# Patient Record
Sex: FEMALE | NOT HISPANIC OR LATINO | Employment: OTHER
[De-identification: names, ages, dates, MRNs, and addresses within clinical notes are randomized per-mention and may not be internally consistent; named-entity substitution may affect disease eponyms.]

---

## 2017-10-01 ENCOUNTER — HEALTH MAINTENANCE LETTER (OUTPATIENT)
Age: 52
End: 2017-10-01

## 2021-02-23 ENCOUNTER — TRANSCRIBE ORDERS (OUTPATIENT)
Dept: OTHER | Age: 56
End: 2021-02-23

## 2021-02-23 DIAGNOSIS — Z12.11 SCREENING FOR COLON CANCER: Primary | ICD-10-CM

## 2021-06-29 ENCOUNTER — TRANSCRIBE ORDERS (OUTPATIENT)
Dept: OTHER | Age: 56
End: 2021-06-29

## 2021-06-29 DIAGNOSIS — Z00.00 ENCOUNTER FOR PREVENTIVE CARE: Primary | ICD-10-CM

## 2021-11-11 ENCOUNTER — TRANSCRIBE ORDERS (OUTPATIENT)
Dept: OTHER | Age: 56
End: 2021-11-11
Payer: COMMERCIAL

## 2021-11-11 DIAGNOSIS — M54.50 CHRONIC BILATERAL LOW BACK PAIN WITHOUT SCIATICA: Primary | ICD-10-CM

## 2021-11-11 DIAGNOSIS — G89.29 CHRONIC BILATERAL LOW BACK PAIN WITHOUT SCIATICA: Primary | ICD-10-CM

## 2021-11-18 ENCOUNTER — OFFICE VISIT (OUTPATIENT)
Dept: NEUROSURGERY | Facility: CLINIC | Age: 56
End: 2021-11-18
Attending: PHYSICIAN ASSISTANT
Payer: COMMERCIAL

## 2021-11-18 VITALS
OXYGEN SATURATION: 100 % | DIASTOLIC BLOOD PRESSURE: 109 MMHG | BODY MASS INDEX: 40.4 KG/M2 | HEART RATE: 74 BPM | WEIGHT: 257.4 LBS | HEIGHT: 67 IN | SYSTOLIC BLOOD PRESSURE: 155 MMHG

## 2021-11-18 DIAGNOSIS — G89.29 CHRONIC BILATERAL LOW BACK PAIN WITH BILATERAL SCIATICA: Primary | ICD-10-CM

## 2021-11-18 DIAGNOSIS — M54.42 CHRONIC BILATERAL LOW BACK PAIN WITH BILATERAL SCIATICA: Primary | ICD-10-CM

## 2021-11-18 DIAGNOSIS — M54.41 CHRONIC BILATERAL LOW BACK PAIN WITH BILATERAL SCIATICA: Primary | ICD-10-CM

## 2021-11-18 PROCEDURE — 99203 OFFICE O/P NEW LOW 30 MIN: CPT | Performed by: PHYSICIAN ASSISTANT

## 2021-11-18 PROCEDURE — G0463 HOSPITAL OUTPT CLINIC VISIT: HCPCS

## 2021-11-18 ASSESSMENT — MIFFLIN-ST. JEOR: SCORE: 1800.19

## 2021-11-18 ASSESSMENT — PAIN SCALES - GENERAL: PAINLEVEL: SEVERE PAIN (7)

## 2021-11-18 NOTE — LETTER
11/18/2021         RE: Lisha Marie  [address]  [address]  [place] [facility] [place] [address]        Dear Colleague,    Thank you for referring your patient, Lisha Marie, to the [facility]. Please see a copy of my visit note below.    Neurosurgery Consult    HPI    Ms. Marie is a 56-year-old female presents to clinic for evaluation of back pain and bilateral sciatic pain radiating down the posterior aspects of legs bilaterally.  He denies any bowel or bladder symptoms or saddle anesthesia.  The pain is worse with sitting for a long time but also bothersome with standing and walking.  She states she is done lots of exercises and has tried medication management with naproxen and the symptoms have been present now for more than a year and are not improving.    Medical history  Obesity  Bipolar disorder  Chronic hepatitis C    Social history  Works in retail selling clothes      B/P: 155/109, T: Data Unavailable, P: 74, R: Data Unavailable       Exam    Alert and oriented no acute distress  Bilateral lower extremities with 5/5 strength  Reflexes 2+ patella/ankle  Negative straight leg raise on the left, positive on the right  Negative ankle clonus negative Babinski bilaterally  Lumbar spine nontender to palpation  Able to stand on heels and toes  Gait is normal    Imaging    No imaging to review    Assessment    Bilateral low back pain with bilateral sciatic pain    Plan:      I recommend patient to lumbar MRI without contrast.  I will contact her with the results when they are available.    Total time of 30 minutes spent with the patient today in counseling and coordination of care.      Again, thank you for allowing me to participate in the care of your patient.        Sincerely,        Justin Mosqueda PA-C

## 2021-11-18 NOTE — PROGRESS NOTES
Neurosurgery Consult    HPI    Ms. Marie is a 56-year-old female presents to clinic for evaluation of back pain and bilateral sciatic pain radiating down the posterior aspects of legs bilaterally.  He denies any bowel or bladder symptoms or saddle anesthesia.  The pain is worse with sitting for a long time but also bothersome with standing and walking.  She states she is done lots of exercises and has tried medication management with naproxen and the symptoms have been present now for more than a year and are not improving.    Medical history  Obesity  Bipolar disorder  Chronic hepatitis C    Social history  Works in retail selling clothes      B/P: 155/109, T: Data Unavailable, P: 74, R: Data Unavailable       Exam    Alert and oriented no acute distress  Bilateral lower extremities with 5/5 strength  Reflexes 2+ patella/ankle  Negative straight leg raise on the left, positive on the right  Negative ankle clonus negative Babinski bilaterally  Lumbar spine nontender to palpation  Able to stand on heels and toes  Gait is normal    Imaging    No imaging to review    Assessment    Bilateral low back pain with bilateral sciatic pain    Plan:      I recommend patient to lumbar MRI without contrast.  I will contact her with the results when they are available.    Total time of 30 minutes spent with the patient today in counseling and coordination of care.

## 2021-11-18 NOTE — NURSING NOTE
"Lisha Marie is a 56 year old female who presents for:  Chief Complaint   Patient presents with     Neurologic Problem     Pain in both legs, and lower back.         Initial Vitals:  BP (!) 155/109   Pulse 74   Ht 5' 7\" (1.702 m)   Wt 257 lb 6.4 oz (116.8 kg)   SpO2 100%   BMI 40.31 kg/m   Estimated body mass index is 40.31 kg/m  as calculated from the following:    Height as of this encounter: 5' 7\" (1.702 m).    Weight as of this encounter: 257 lb 6.4 oz (116.8 kg).. Body surface area is 2.35 meters squared. BP completed using cuff size: large  Severe Pain (7)    Nursing Comments:     David Richardson    "

## 2022-03-25 ENCOUNTER — LAB REQUISITION (OUTPATIENT)
Dept: LAB | Facility: CLINIC | Age: 57
End: 2022-03-25
Payer: COMMERCIAL

## 2022-03-25 DIAGNOSIS — F25.1 SCHIZOAFFECTIVE DISORDER, DEPRESSIVE TYPE (H): ICD-10-CM

## 2022-03-25 LAB
ALBUMIN SERPL-MCNC: 3.6 G/DL (ref 3.4–5)
ALP SERPL-CCNC: 56 U/L (ref 40–150)
ALT SERPL W P-5'-P-CCNC: 17 U/L (ref 0–50)
ANION GAP SERPL CALCULATED.3IONS-SCNC: 7 MMOL/L (ref 3–14)
AST SERPL W P-5'-P-CCNC: 14 U/L (ref 0–45)
BILIRUB SERPL-MCNC: 0.5 MG/DL (ref 0.2–1.3)
BUN SERPL-MCNC: 18 MG/DL (ref 7–30)
CALCIUM SERPL-MCNC: 8.6 MG/DL (ref 8.5–10.1)
CHLORIDE BLD-SCNC: 107 MMOL/L (ref 94–109)
CO2 SERPL-SCNC: 25 MMOL/L (ref 20–32)
CREAT SERPL-MCNC: 0.9 MG/DL (ref 0.52–1.04)
GFR SERPL CREATININE-BSD FRML MDRD: 76 ML/MIN/1.73M2
GLUCOSE BLD-MCNC: 76 MG/DL (ref 70–99)
POTASSIUM BLD-SCNC: 3.6 MMOL/L (ref 3.4–5.3)
PROT SERPL-MCNC: 8.1 G/DL (ref 6.8–8.8)
SODIUM SERPL-SCNC: 139 MMOL/L (ref 133–144)
TSH SERPL DL<=0.005 MIU/L-ACNC: 0.88 MU/L (ref 0.4–4)

## 2022-03-25 PROCEDURE — 84443 ASSAY THYROID STIM HORMONE: CPT | Mod: ORL

## 2022-03-25 PROCEDURE — 80053 COMPREHEN METABOLIC PANEL: CPT | Mod: ORL

## 2022-09-27 ENCOUNTER — TRANSCRIBE ORDERS (OUTPATIENT)
Dept: OTHER | Age: 57
End: 2022-09-27

## 2022-09-27 DIAGNOSIS — M25.562 CHRONIC PAIN OF BOTH KNEES: Primary | ICD-10-CM

## 2022-09-27 DIAGNOSIS — G89.29 CHRONIC PAIN OF BOTH KNEES: Primary | ICD-10-CM

## 2022-09-27 DIAGNOSIS — M25.561 CHRONIC PAIN OF BOTH KNEES: Primary | ICD-10-CM

## 2023-02-14 DIAGNOSIS — G47.33 OSA (OBSTRUCTIVE SLEEP APNEA): Primary | ICD-10-CM

## 2023-02-15 ENCOUNTER — LAB REQUISITION (OUTPATIENT)
Dept: LAB | Facility: CLINIC | Age: 58
End: 2023-02-15
Payer: COMMERCIAL

## 2023-02-15 DIAGNOSIS — R53.82 CHRONIC FATIGUE, UNSPECIFIED: ICD-10-CM

## 2023-02-15 PROCEDURE — 80053 COMPREHEN METABOLIC PANEL: CPT | Mod: ORL

## 2023-02-15 PROCEDURE — 84443 ASSAY THYROID STIM HORMONE: CPT | Mod: ORL

## 2023-02-15 PROCEDURE — 82306 VITAMIN D 25 HYDROXY: CPT | Mod: ORL

## 2023-02-16 LAB
ALBUMIN SERPL BCG-MCNC: 4.2 G/DL (ref 3.5–5.2)
ALP SERPL-CCNC: 50 U/L (ref 35–104)
ALT SERPL W P-5'-P-CCNC: 13 U/L (ref 10–35)
ANION GAP SERPL CALCULATED.3IONS-SCNC: 13 MMOL/L (ref 7–15)
AST SERPL W P-5'-P-CCNC: 18 U/L (ref 10–35)
BILIRUB SERPL-MCNC: 0.3 MG/DL
BUN SERPL-MCNC: 14.2 MG/DL (ref 6–20)
CALCIUM SERPL-MCNC: 9.3 MG/DL (ref 8.6–10)
CHLORIDE SERPL-SCNC: 106 MMOL/L (ref 98–107)
CREAT SERPL-MCNC: 0.81 MG/DL (ref 0.51–0.95)
DEPRECATED CALCIDIOL+CALCIFEROL SERPL-MC: 15 UG/L (ref 20–75)
DEPRECATED HCO3 PLAS-SCNC: 25 MMOL/L (ref 22–29)
GFR SERPL CREATININE-BSD FRML MDRD: 85 ML/MIN/1.73M2
GLUCOSE SERPL-MCNC: 89 MG/DL (ref 70–99)
POTASSIUM SERPL-SCNC: 3.5 MMOL/L (ref 3.4–5.3)
PROT SERPL-MCNC: 7.6 G/DL (ref 6.4–8.3)
SODIUM SERPL-SCNC: 144 MMOL/L (ref 136–145)
TSH SERPL DL<=0.005 MIU/L-ACNC: 0.6 UIU/ML (ref 0.3–4.2)

## 2024-03-18 ENCOUNTER — LAB REQUISITION (OUTPATIENT)
Dept: LAB | Facility: CLINIC | Age: 59
End: 2024-03-18
Payer: COMMERCIAL

## 2024-03-18 DIAGNOSIS — Z51.81 ENCOUNTER FOR THERAPEUTIC DRUG LEVEL MONITORING: ICD-10-CM

## 2024-03-18 DIAGNOSIS — F25.0 SCHIZOAFFECTIVE DISORDER, BIPOLAR TYPE (H): ICD-10-CM

## 2024-03-18 LAB
ALBUMIN SERPL BCG-MCNC: 4.1 G/DL (ref 3.5–5.2)
ALP SERPL-CCNC: 69 U/L (ref 40–150)
ALT SERPL W P-5'-P-CCNC: 16 U/L (ref 0–50)
ANION GAP SERPL CALCULATED.3IONS-SCNC: 12 MMOL/L (ref 7–15)
AST SERPL W P-5'-P-CCNC: 19 U/L (ref 0–45)
BILIRUB SERPL-MCNC: 0.2 MG/DL
BUN SERPL-MCNC: 19 MG/DL (ref 6–20)
CALCIUM SERPL-MCNC: 9 MG/DL (ref 8.6–10)
CHLORIDE SERPL-SCNC: 106 MMOL/L (ref 98–107)
CREAT SERPL-MCNC: 0.85 MG/DL (ref 0.51–0.95)
DEPRECATED HCO3 PLAS-SCNC: 24 MMOL/L (ref 22–29)
EGFRCR SERPLBLD CKD-EPI 2021: 80 ML/MIN/1.73M2
GLUCOSE SERPL-MCNC: 96 MG/DL (ref 70–99)
POTASSIUM SERPL-SCNC: 3.6 MMOL/L (ref 3.4–5.3)
PROT SERPL-MCNC: 7.6 G/DL (ref 6.4–8.3)
SODIUM SERPL-SCNC: 142 MMOL/L (ref 135–145)
TSH SERPL DL<=0.005 MIU/L-ACNC: 0.58 UIU/ML (ref 0.3–4.2)

## 2024-03-18 PROCEDURE — 80053 COMPREHEN METABOLIC PANEL: CPT | Mod: ORL | Performed by: NURSE PRACTITIONER

## 2024-03-18 PROCEDURE — 84443 ASSAY THYROID STIM HORMONE: CPT | Mod: ORL | Performed by: NURSE PRACTITIONER

## 2025-04-01 ENCOUNTER — HOSPITAL ENCOUNTER (OUTPATIENT)
Facility: CLINIC | Age: 60
Setting detail: OBSERVATION
Discharge: HOME | End: 2025-04-02
Attending: EMERGENCY MEDICINE | Admitting: PSYCHIATRY & NEUROLOGY
Payer: COMMERCIAL

## 2025-04-01 DIAGNOSIS — F25.1 SCHIZOAFFECTIVE DISORDER, DEPRESSIVE TYPE (H): ICD-10-CM

## 2025-04-01 PROBLEM — F32.A DEPRESSION, UNSPECIFIED: Status: ACTIVE | Noted: 2025-04-01

## 2025-04-01 PROBLEM — F41.9 ANXIETY DISORDER, UNSPECIFIED: Status: ACTIVE | Noted: 2025-04-01

## 2025-04-01 PROCEDURE — 250N000013 HC RX MED GY IP 250 OP 250 PS 637: Performed by: NURSE PRACTITIONER

## 2025-04-01 PROCEDURE — 99285 EMERGENCY DEPT VISIT HI MDM: CPT

## 2025-04-01 PROCEDURE — G0378 HOSPITAL OBSERVATION PER HR: HCPCS

## 2025-04-01 PROCEDURE — 99222 1ST HOSP IP/OBS MODERATE 55: CPT | Mod: AI | Performed by: NURSE PRACTITIONER

## 2025-04-01 RX ORDER — DIPHENHYDRAMINE HCL 50 MG/1
50 CAPSULE ORAL EVERY 6 HOURS PRN
COMMUNITY
Start: 2025-02-05

## 2025-04-01 RX ORDER — NAPROXEN 500 MG/1
500 TABLET, DELAYED RELEASE ORAL
COMMUNITY
Start: 2024-08-29 | End: 2025-04-01

## 2025-04-01 RX ORDER — ACETAMINOPHEN 500 MG
1000 TABLET ORAL EVERY 6 HOURS PRN
Status: DISCONTINUED | OUTPATIENT
Start: 2025-04-01 | End: 2025-04-02 | Stop reason: HOSPADM

## 2025-04-01 RX ORDER — GABAPENTIN 300 MG/1
300 CAPSULE ORAL PRN
COMMUNITY
Start: 2022-01-10

## 2025-04-01 RX ORDER — ACETAMINOPHEN 500 MG
1000 TABLET ORAL EVERY 6 HOURS PRN
COMMUNITY
Start: 2022-01-10

## 2025-04-01 RX ORDER — AMLODIPINE BESYLATE 5 MG/1
5 TABLET ORAL DAILY
Status: DISCONTINUED | OUTPATIENT
Start: 2025-04-02 | End: 2025-04-02 | Stop reason: HOSPADM

## 2025-04-01 RX ORDER — GABAPENTIN 300 MG/1
300-600 CAPSULE ORAL 3 TIMES DAILY PRN
Status: DISCONTINUED | OUTPATIENT
Start: 2025-04-01 | End: 2025-04-02 | Stop reason: HOSPADM

## 2025-04-01 RX ORDER — NAPROXEN 375 MG/1
500 TABLET ORAL 2 TIMES DAILY WITH MEALS
COMMUNITY
Start: 2021-11-10

## 2025-04-01 RX ORDER — DIPHENHYDRAMINE HCL 25 MG
50 CAPSULE ORAL EVERY 6 HOURS PRN
Status: DISCONTINUED | OUTPATIENT
Start: 2025-04-01 | End: 2025-04-02 | Stop reason: HOSPADM

## 2025-04-01 RX ORDER — OLANZAPINE 10 MG/1
10 TABLET ORAL AT BEDTIME
Status: DISCONTINUED | OUTPATIENT
Start: 2025-04-01 | End: 2025-04-02 | Stop reason: HOSPADM

## 2025-04-01 RX ORDER — NAPROXEN 250 MG/1
500 TABLET ORAL 2 TIMES DAILY WITH MEALS
Status: DISCONTINUED | OUTPATIENT
Start: 2025-04-01 | End: 2025-04-02 | Stop reason: HOSPADM

## 2025-04-01 RX ADMIN — OLANZAPINE 10 MG: 10 TABLET, FILM COATED ORAL at 21:47

## 2025-04-01 RX ADMIN — GABAPENTIN 600 MG: 300 CAPSULE ORAL at 17:25

## 2025-04-01 RX ADMIN — DIPHENHYDRAMINE HYDROCHLORIDE 50 MG: 25 CAPSULE ORAL at 17:25

## 2025-04-01 RX ADMIN — NAPROXEN 500 MG: 250 TABLET ORAL at 17:25

## 2025-04-01 ASSESSMENT — COLUMBIA-SUICIDE SEVERITY RATING SCALE - C-SSRS
2. HAVE YOU ACTUALLY HAD ANY THOUGHTS OF KILLING YOURSELF IN THE PAST MONTH?: NO
6. HAVE YOU EVER DONE ANYTHING, STARTED TO DO ANYTHING, OR PREPARED TO DO ANYTHING TO END YOUR LIFE?: NO
1. IN THE PAST MONTH, HAVE YOU WISHED YOU WERE DEAD OR WISHED YOU COULD GO TO SLEEP AND NOT WAKE UP?: NO

## 2025-04-01 ASSESSMENT — ACTIVITIES OF DAILY LIVING (ADL)
ADLS_ACUITY_SCORE: 41

## 2025-04-01 NOTE — CONSULTS
"Diagnostic Evaluation Consultation  Crisis Assessment    Patient Name: Lisha Marie  Age:  59 year old  Legal Sex: female  Gender Identity: female  Pronouns: she/her  Race: Choose not to Answer  Ethnicity: Not  or   Language: English      Patient was assessed: In person   Crisis Assessment Start Date: 04/01/25  Crisis Assessment Start Time: 1527  Crisis Assessment Stop Time: 1551  Patient location: [facility] Emergency Dept                             [id number]    Referral Data and Chief Complaint  Lisha Marie presents to the ED with family/friends. Patient is presenting to the ED for the following concerns: Depression, Anxiety. Factors that make the mental health crisis life threatening or complex are: Pt arrives to the ED after experiencing heightened emotional distress today in her home. Pt reports that she woke up feeling unable to get out of bed due to feeling especially depressed and anxious. Pt says that she remained in bed and was crying profusely and suffering panic attacks throughout the morning, when she called loved ones to bring her to the hospital. During assessment, pt oriented to person, place, time, and situation, but guarded and easily overwhelmed by questions from writer. Pt says that she is hoping to see a psychiatrist so that she can get a possible medication adjustment and noted that she was supposed to have a psychiatry appointment today at the [facility] clinic but was unable to attend her appointment due to her distress. Pt unable to recall if she has psychiatry established and unable to recall if she has case management services. Pt reports that she was prescribed Lithium in the past, and it was helpful, but that providers had her stop taking it due to complications from her other medications. Pt reports that she takes \"a lot\" of different medications to help her with sleep. Pt became upset when writer asked if she has been experiencing suicidal ideation, adamantly " "stating that she does not have any SI and expressing frustration at having been asked this multiple times today by various individuals. Pt says that suicide is a \"sin\" and that she has no desire to hurt herself. Pt is denying any HI/AH/VH/NSSIB. Pt denies any recent substance use. Pt reports that she is willing to come over to [facility] to discuss medication management with the psychiatric provider and to work on symptom stabilization..      Informed Consent and Assessment Methods  Explained the crisis assessment process, including applicable information disclosures and limits to confidentiality, assessed understanding of the process, and obtained consent to proceed with the assessment.  Assessment methods included conducting a formal interview with patient, review of medical records, collaboration with medical staff, and obtaining relevant collateral information from family and community providers when available.  : done     History of the Crisis   Per chart review, pt has a history of Shizoaffective Disorder Bipolar Type and PTSD. Chart indicates pt has a history of Alcohol Use Disorder. Pt denies that she's had any previous inpatient psychiatric hospitalizations and denies any previous suicide attempts. Pt's daughter, who accompanies pt during current ED visit, says that pt does not normally present as agitated and argumentative as she has today, and that it is indicative of her heightened anxiety.    Brief Psychosocial History  Family:   (Pt lives alone but has a significant other named Ayo), Children    Support System:  Significant Other, Children (daughter Amparo)  Employment Status:  employed part-time (works in retail part-time)  Source of Income:  salary/wages  Financial Environmental Concerns:     Current Hobbies:  interaction with pets  Barriers in Personal Life:       Significant Clinical History  Current Anxiety Symptoms:  panic attack, excessive worry, anxious, racing thoughts  Current " Depression/Trauma:  negativistic, crying or feels like crying, hopelessness  Current Somatic Symptoms:     Current Psychosis/Thought Disturbance:     Current Eating Symptoms:     Chemical Use History:  Alcohol: None  Benzodiazepines: None  Opiates: None  Cocaine: None  Marijuana: None  Other Use: None   Past diagnosis:  Other, Depression, Anxiety Disorder (Schizoaffective Disorder, Bipolar Type)  Family history:  No known history of mental health or chemical health concerns  Past treatment:     Details of most recent treatment:  Pt denies being established with an outpatient psychotherapist and says it is unknown to her if she has a psychiatrist or case management worker. Pt reports that she is having a hard time remembering things right now due to her anxiety and racing thoughts. Pt has established primary care at [facility]. Pt denies any previous psychiatric inpatient hospitalizations.  Other relevant history:  Per chart review, pt had a telemedicine visit with a psychiatric provider at [facility] today, 04/01/2025.    Have there been any medication changes in the past two weeks:  no       Is the patient compliant with medications:  yes        Collateral Information  Is there collateral information: Yes     Collateral information name, relationship, phone number:  daughter Amparo Marie [phone]    What happened today: Amparo says she was at work today and noticed that she had missed phone calls from her aunt, which is not typical. She called her aunt, who told her that she needs to get in contact with her mother/pt, who she said was crying hysterically. Amparo called her mother, who was crying profusely and saying she was in a lot of pain. Amparo says she does not know if she meant physical pain or emotional pain, but that it was her impression that this was her mother's way of communicating that she has in a lot of emotional distress. Pt requested to be brought to a hospital, so pt brought her here for psych  evaluation.     What is different about patient's functioning: Pt has had depression and anxiety for years, but today was a clear diversion from her typical baseline functioning. Amparo says it's been a long time since she's seen her mother this escalated.     What do you think the patient needs:  medication management and symptom stabilization at [facility]    Has patient made comments about wanting to kill themselves/others: no    If d/c is recommended, can they take part in safety/aftercare planning: yes    Additional collateral information:   n/a     Risk Assessment  [place] Suicide Severity Rating Scale Full Clinical Version: 04/01/2025  Suicidal Ideation  Q1 Wish to be Dead (Lifetime): No  Q2 Non-Specific Active Suicidal Thoughts (Lifetime): No  Q6 Suicide Behavior (Lifetime): no  Intensity of Ideation (Lifetime)  Description of Most Severe Ideation (Lifetime): n/a  Suicidal Behavior (Lifetime)  Actual Attempt (Lifetime): No  Has subject engaged in non-suicidal self-injurious behavior? (Lifetime): No  Interrupted Attempts (Lifetime): No  Aborted or Self-Interrupted Attempt (Lifetime): No  Preparatory Acts or Behavior (Lifetime): No    [place] Suicide Severity Rating Scale Recent: 04/01/2025  Suicidal Ideation (Recent)  Q1 Wished to be Dead (Past Month): no  Q2 Suicidal Thoughts (Past Month): no  Level of Risk per Screen: no risks indicated     Suicidal Behavior (Recent)  Actual Attempt (Past 3 Months): No  Total Number of Actual Attempts (Past 3 Months): 0  Actual Attempt Description (Past 3 Months): n/a  Has subject engaged in non-suicidal self-injurious behavior? (Past 3 Months): No  Interrupted Attempts (Past 3 Months): No  Total Number of Interrupted Attempts (Past 3 Months): 0  Interrupted Attempt Description (Past 3 Months): n/a  Aborted or Self-Interrupted Attempt (Past 3 Months): No  Total Number of Aborted or Self-Interrupted Attempts (Past 3 Months): 0  Aborted or Self-Interrupted Attempt Description  (Past 3 Months): n/a  Preparatory Acts or Behavior (Past 3 Months): No  Total Number of Preparatory Acts (Past 3 Months): 0  Preparatory Acts or Behavior Description (Past 3 Months): n/a    Environmental or Psychosocial Events: social isolation  Protective Factors: Protective Factors: strong bond to family unit, community support, or employment, responsibilities and duties to others, including pets and children, lives in a responsibly safe and stable environment, help seeking, sense of importance of health and wellness, reality testing ability    Does the patient have thoughts of harming others? Feels Like Hurting Others: no  Previous Attempt to Hurt Others: no  Is the patient engaging in sexually inappropriate behavior?: no  Does Patient have a known history of aggressive behavior: No  Has aggression occurred as a result of MH concerns/diagnosis: n/a  Does patient have history of aggression in hospital: n/a    Is the patient engaging in sexually inappropriate behavior?  no        Mental Status Exam   Affect: Dramatic  Appearance: Appropriate  Attention Span/Concentration: Attentive  Eye Contact: Avoidant    Fund of Knowledge: Appropriate   Language /Speech Content: Fluent  Language /Speech Volume: Normal  Language /Speech Rate/Productions: Articulate  Recent Memory: Variable  Remote Memory: Intact  Mood: Depressed, Anxious, Irritable  Orientation to Person: Yes   Orientation to Place: Yes  Orientation to Time of Day: Yes  Orientation to Date: Yes     Situation (Do they understand why they are here?): Yes  Psychomotor Behavior: Normal  Thought Content: Clear  Thought Form: Intact        Medication  Psychotropic medications:   Medication Orders - Psychiatric (From admission, onward)      None             Current Care Team  Patient Care Team:  Valarie Chandler MD as PCP - General  CobbDhiraj cardoso MD as Referring Physician (Internal Medicine)    Diagnosis  Patient Active Problem List   Diagnosis Code     Schizoaffective disorder, bipolar type (H) F25.0    Arthritis M19.90    Essential hypertension I10    Hepatic steatosis K76.0    Chronic hepatitis C without hepatic coma (H) B18.2    Morbid obesity due to excess calories (H) E66.01    Depression, unspecified F32.A    Anxiety disorder, unspecified F41.9       Primary Problem This Admission  Active Hospital Problems    Depression, unspecified - F32.A      *Anxiety disorder, unspecified - F41.9      Schizoaffective disorder, bipolar type (H) - F25.0        Clinical Summary and Substantiation of Recommendations   Clinical Substantiation:  It is the recommendation of this clinician that the pt remain in the ER in observation status. The pt presented to the ED after experiencing acute emotional distress and anxiety while at home today, which led to her having  an episode of panic attacks and inability to get out of bed. The pt is denying SI/HI/AH/VH/NSSIB or substance use and has protective factors such as a support system, help seeking behavior, and future focused thinking. However, pt is expressing that her functioning in carrying out tasks at home is impacted by her crisis state. Throughout assessment, pt presents as easily overwhelmed. Given her presentation, observation is the least restrictive level of care that also provides adequate risk mitigation. Additional time on the unit gives time and space from the precipitating stressor while engaging in therapeutic intervention aimed at increasing distress tolerance skills and creating a cope ahead plan for interpersonal stressors. There is a likelihood that this time in observation will lessen the acuity of the crisis, enabling the pt to effectively safety plan and return to the community. Pt has a history of psychiatric medication management but is not currently established with a psychiatric provider. For continuity of care, it is recommended to schedule the pt with the Transition Clinic within 24 to 48 hours of  discharge.    Goals for crisis stabilization:  symptom stabilization, development of coping skills    Next steps for Care Team:  safety planning with pt before discharge and set up of services/referrals for pt at time of discharge    Treatment Objectives Addressed:  orienting the patient to therapy, identifying and practicing coping strategies, assessing safety    Therapeutic Interventions:  Engaged in cognitive restructuring/ reframing, looked at common cognitive distortions and challenged negative thoughts.    Has a specific means been identified for suicidal/homicide actions: No      Patient coping skills attempted to reduce the crisis:  Pt reports that she prays to cope with her emotional distress and anxiety.    Disposition  Recommended referrals: Individual Therapy, Medication Management        Reviewed case and recommendations with attending provider. Attending Name: Elvis Scott CNP       Attending concurs with disposition: yes       Patient and/or validated legal guardian concurs with disposition: yes       Final disposition:  observation        Legal status: Voluntary/Patient has signed consent for treatment                                                                             Reviewed court records: yes       Assessment Details   Total duration spent with the patient: 24 min     CPT code(s) utilized: 47642 - Psychotherapy (with patient) - 30 (16-37*) min    ANA Solares, Psychotherapist  DEC - Triage & Transition Services  Callback: [phone]

## 2025-04-01 NOTE — ED NOTES
"59 year old female received to the [facility] unit in an agitated-dysregulated state. Reports immobilizing anxiety and heightened arthritis pain. Could not \"get out of bed\" today so directed in by clinic and brought in by family. History of schizoaffective disorder-Bipolar type. Stable in past on Lithium but lithium currently contraindicated due to other medication/medical issues per patient. Very labile and demanding in presentation. Eventually agreed to try [facility] so she could be evaluated by psychiatry. Denies any current SI.     Nursing and risk assessments completed.  Assessments reviewed with LMHP and physician. Video monitoring in progress, patient informed.  Admission information reviewed with patient. Patient given a tour of [facility] and instructions on using the facility. Questions regarding [facility] addressed. Pt search completed and belongings inventoried.   "

## 2025-04-01 NOTE — ED NOTES
Pt. became increasingly agitated as writer attempted to educate patient about the [facility] unit. Very reluctant to be without her daughter and sit/sleep in a recliner due to arthritis pain. Reluctant to secure her belongings and reluctant to be in an open milieu. DEC advised to see patient over in ER -will transition to [facility] if she changes her mind.

## 2025-04-01 NOTE — PLAN OF CARE
Lisha Marie  April 1, 2025  Plan of Care Hand-off Note     Patient Recommended Care Path: observation    Clinical Substantiation:  It is the recommendation of this clinician that the pt remain in the ER in observation status. The pt presented to the ED after experiencing acute emotional distress and anxiety while at home today, which led to her having  an episode of panic attacks and inability to get out of bed. The pt is denying SI/HI/AH/VH/NSSIB or substance use and has protective factors such as a support system, help seeking behavior, and future focused thinking. However, pt is expressing that her functioning in carrying out tasks at home is impacted by her crisis state. Throughout assessment, pt presents as easily overwhelmed. Given her presentation, observation is the least restrictive level of care that also provides adequate risk mitigation. Additional time on the unit gives time and space from the precipitating stressor while engaging in therapeutic intervention aimed at increasing distress tolerance skills and creating a cope ahead plan for interpersonal stressors. There is a likelihood that this time in observation will lessen the acuity of the crisis, enabling the pt to effectively safety plan and return to the community. Pt has a history of psychiatric medication management but is not currently established with a psychiatric provider. For continuity of care, it is recommended to schedule the pt with the Transition Clinic within 24 to 48 hours of discharge.    Goals for crisis stabilization:  symptom stabilization, development of coping skills    Next steps for Care Team:  safety planning with pt before discharge and set up of services/referrals for pt at time of discharge    Treatment Objectives Addressed:  orienting the patient to therapy, identifying and practicing coping strategies, assessing safety    Therapeutic Interventions:  Engaged in cognitive restructuring/ reframing, looked at common  cognitive distortions and challenged negative thoughts.    Has a specific means been identified for suicidal.homicide actions: No    Patient coping skills attempted to reduce the crisis:  Pt reports that she prays to cope with her emotional distress and anxiety.      Collateral contact information:  daughter Amparo Marie [phone]    Legal Status: Voluntary/Patient has signed consent for treatment                                                   Reviewed court records: yes     Psychiatry Consult:     ANA Solares

## 2025-04-01 NOTE — ED NOTES
Pt states that she had previously been on lithium and it worked great but because of other medications she was on, she had to be taken off.  Pt states that because of her anxiety and depression, she has not been able to properly care for herself.  States that she has not been able to perform her ADLs.

## 2025-04-01 NOTE — ED NOTES
[facility]  ED to [facility] Checklist:      Goal for [facility]: Depression management, Medication management,  Referral and resources, and Time to stabilize    Current Behavior: Sad    Safety Concerns: None    Legal Hold Status: Voluntary    Medically Cleared by ED provider: Yes    Patient Therapeutically Searched: Therapeutic search by ED staff (strings, belts, shoes, pockets, electronics, etc.)    Belongings: Remain with patient    Independent Ambulation at Baseline: Yes/No: Yes    Participates in Care/Conversation: Yes/No: Yes    Patient Informed about [facility]: Yes/No: Yes    DEC: Ordered and pending    Patient Ready to be Transferred to [facility]? Yes/No: Yes

## 2025-04-01 NOTE — ED PROVIDER NOTES
"  Emergency Department Note      History of Present Illness     Chief Complaint   Psychiatric Evaluation      HPI   Lisha Marie is a 59 year old female with history of schizoaffective disorder presenting with her daughter for a psychiatric evaluation.  She reports severe depression and anxiety such that she \"cannot move\" and cannot care for herself worsening over the last month.  Last night was particularly bad as she believes she had an anxiety attack.  She feels \"restless\" with a \"scrambled memory.\"  She is compliant with her psychiatric medications but does not feel they are helpful.  In the past she has been on lithium and found that to be the only thing that helped her mood.  She denies suicidal ideation and homicidal ideation and tells me, \"I love myself.\"  She has never required psychiatric hospitalization in the past.  While she has a psychiatry provider, she does not have a therapist.  She denies drug or alcohol use.  She has chronic insomnia.    Independent Historian   None    Review of External Notes   I reviewed the patients visit with psychiatry today via telehealth. Patient is unable to do the things she needs to do in order to take care of herself, mentioning on the call that she cannot get out of bed and needs help. They felt that she was decompensated and encouraged her to go to the hospital, they ordered her Prozac but stated it will take a while for it to work effectively.     Past Medical History     Medical History and Problem List   Alcohol abuse  Chronic pain of both knees  PTSD  Schizoaffective disorder  Chronic hepatitis C without hepatic coma  Hypertension  Arthritis  Bipolar disorder without psychotic features  Hepatic steatosis  Morbid obesity due to excess calories    Medications   Abilify  Wellbutrin XL  Cymbalta  Prozac  Neurontin  Hyzaar  Naprosyn  Desyrel  Norvasc  Harvoni    Physical Exam     Patient Vitals for the past 24 hrs:   BP Temp Temp src Pulse Resp SpO2 Height Weight " "  04/01/25 1329 (!) 141/103 98.5  F (36.9  C) Temporal 80 18 100 % 1.702 m (5' 7\") 108.9 kg (240 lb)     Physical Exam  General: Well-developed and well-nourished. Well appearing middle-aged -American woman. Cooperative.  Head:  Atraumatic.  Eyes:  Conjunctivae, lids, and sclerae are normal.  ENT:    Normal nose. Moist mucous membranes.  Neck:  Supple. Normal range of motion.  CV:  Well-perfused   Resp:  No respiratory distress.   GI:  Non-distended.     MS:  Normal ROM.   Skin:  Warm. Non-diaphoretic. No pallor.  Neuro:  Awake. A&Ox3. Normal strength.  Psych: Anxious and dysphoric mood and affect. Normal speech.  No suicidal thought content or plan.  Not responding to internal stimuli.  Vitals reviewed.    Diagnostics     Lab Results   Labs Ordered and Resulted from Time of ED Arrival to Time of ED Departure - No data to display    Imaging   No orders to display       Independent Interpretation   None    ED Course      Medications Administered   Medications - No data to display    Procedures   Procedures     Discussion of Management   None    ED Course   ED Course as of 04/01/25 1656   Tue Apr 01, 2025   1421 I obtained history and examined the patient as noted above     1553 I spoke with PRERNA Antonio, after her evaluation of the patient.        Additional Documentation  {EPPAAdditionalPhrasnimo:[id number]::\"None\"}    Medical Decision Making / Diagnosis     CMS Diagnoses: None    MIPS       None    MIGUEL ÁNGEL Marie is a 59 year old female ***    Disposition   The patient was transferred to [facility].     Diagnosis   No diagnosis found.     Discharge Medications   New Prescriptions    No medications on file         Scribe Disclosure:  I, Moy Amor, am serving as a scribe at 1:37 PM on 4/1/2025 to document services personally performed by Leora Johnston MD based on my observations and the provider's statements to me.     " sent there without issue.    Disposition   The patient was transferred to [facility].     Diagnosis   Schizoaffective disorder      Scribe Disclosure:  I, Moy Amor, am serving as a scribe at 1:37 PM on 4/1/2025 to document services personally performed by Leora Johnston MD based on my observations and the provider's statements to me.        Leora Johnston MD  04/04/25 2025

## 2025-04-02 VITALS
BODY MASS INDEX: 39.19 KG/M2 | WEIGHT: 249.7 LBS | SYSTOLIC BLOOD PRESSURE: 134 MMHG | RESPIRATION RATE: 16 BRPM | OXYGEN SATURATION: 100 % | TEMPERATURE: 98 F | DIASTOLIC BLOOD PRESSURE: 97 MMHG | HEART RATE: 65 BPM | HEIGHT: 67 IN

## 2025-04-02 PROCEDURE — G0378 HOSPITAL OBSERVATION PER HR: HCPCS

## 2025-04-02 PROCEDURE — 99239 HOSP IP/OBS DSCHRG MGMT >30: CPT | Performed by: PSYCHIATRY & NEUROLOGY

## 2025-04-02 PROCEDURE — 250N000013 HC RX MED GY IP 250 OP 250 PS 637: Performed by: NURSE PRACTITIONER

## 2025-04-02 RX ADMIN — NAPROXEN 500 MG: 250 TABLET ORAL at 07:55

## 2025-04-02 RX ADMIN — AMLODIPINE BESYLATE 5 MG: 5 TABLET ORAL at 07:55

## 2025-04-02 ASSESSMENT — COLUMBIA-SUICIDE SEVERITY RATING SCALE - C-SSRS
ATTEMPT SINCE LAST CONTACT: NO
1. SINCE LAST CONTACT, HAVE YOU WISHED YOU WERE DEAD OR WISHED YOU COULD GO TO SLEEP AND NOT WAKE UP?: NO
2. HAVE YOU ACTUALLY HAD ANY THOUGHTS OF KILLING YOURSELF?: NO
6. HAVE YOU EVER DONE ANYTHING, STARTED TO DO ANYTHING, OR PREPARED TO DO ANYTHING TO END YOUR LIFE?: NO
TOTAL  NUMBER OF ABORTED OR SELF INTERRUPTED ATTEMPTS SINCE LAST CONTACT: NO
SUICIDE, SINCE LAST CONTACT: NO
TOTAL  NUMBER OF INTERRUPTED ATTEMPTS SINCE LAST CONTACT: NO

## 2025-04-02 ASSESSMENT — ACTIVITIES OF DAILY LIVING (ADL)
HYGIENE/GROOMING: INDEPENDENT
ADLS_ACUITY_SCORE: 41

## 2025-04-02 NOTE — ED PROVIDER NOTES
"EmPATH Unit - Psychiatric Observation Discharge Summary  [facility] Emergency Department  Discharge Date: 4/2/2025    Lisha Marie MRN: [id number]   Age: 59 year old YOB: 1965     Brief HPI & Initial ED Course     Chief Complaint   Patient presents with    Psychiatric Evaluation     HPI  Lisha Marie is a 59 year old female with history notable for        Physical Examination   BP: (!) 134/97  Pulse: 65  Temp: 98  F (36.7  C)  Resp: 16  Height: 170.2 cm (5' 7\")  Weight: 113.3 kg (249 lb 11.2 oz)  SpO2: 100 %    Physical Exam  General: Appears stated age.   Neuro: Alert and fully oriented. Extremities appear to demonstrate normal strength on visual inspection.   Integumentary/Skin: no rash visualized, normal color    Psychiatric Examination   Appearance: awake, alert  Attitude:  uncooperative  Eye Contact:  poor   Mood:  angry  Affect:  mood congruent and irritable  Speech:  clear, coherent  Psychomotor Behavior:  no evidence of tardive dyskinesia, dystonia, or tics  Thought Process:  linear  Associations:  no loose associations  Thought Content:  no evidence of suicidal ideation or homicidal ideation and no evidence of psychotic thought  Insight:  fair  Judgement:  fair    Results     ED Course as of 04/02/25 1336   Tue Apr 01, 2025   1421 I obtained history and examined the patient as noted above     1553 I spoke with PRERNA Antonio, after her evaluation of the patient.        Labs Ordered and Resulted from Time of ED Arrival to Time of ED Departure - No data to display    Observation Course   The patient was found to have a psychiatric condition that would benefit from an observation stay in the emergency department for further psychiatric stabilization and/or coordination of a safe disposition. The plan upon observation admission included serial assessments of psychiatric condition, potential administration of medications if indicated, further disposition pending the patient's psychiatric course " "during the monitoring period.     Serial assessments of the patient's psychiatric condition were performed. Nursing notes were reviewed. During the observation period, the patient {did/did not:[id number]::\"did not\"} require medications for agitation, and {did/did not:[id number]::\"did not\"} require restraints/seclusion for patient and/or provider safety.     After a period of working with the treatment team on the EmPATH unit, the patient's mental state improved to allow a safe transition to outpatient care. After counseling on the diagnosis, work-up, and treatment plan, the patient was discharged. Close follow-up with a psychiatrist and/or therapist was recommended and community psychiatric resources were provided. Patient is to return to the ED if any urgent or potentially life-threatening concerns.     Discharge Diagnoses:   Final diagnoses:   Schizoaffective disorder, depressive type (H)       Treatment Plan:  -      At the time of discharge, the patient's acute suicide risk was determined to be low due to the following factors: Reduction in the intensity of mood/anxiety symptoms that preceded the admission, denial of suicidal thoughts, denies feeling helpless or hopeless, not currently under the influence of alcohol or illicit substances, denies experiencing command hallucinations, no immediate access to firearms. The patient's acute risk could be higher if noncompliant with their treatment plan, medications, follow-up appointments or using illicit substances or alcohol. Protective factors include: social supports, children, stable housing, employment, [employer] beliefs, school, expectant mother.    I spent more than 30 minutes on discharge day activities.    --  Deniz Myers MD  [facility] EMERGENCY DEPT  EmPATH Unit    " monitoring period.     Serial assessments of the patient's psychiatric condition were performed. Nursing notes were reviewed. During the observation period, the patient did not require medications for agitation, and did not require restraints/seclusion for patient and/or provider safety.     After a period of working with the treatment team on the EmPATH unit, the patient's mental state improved to allow a safe transition to outpatient care. After counseling on the diagnosis, work-up, and treatment plan, the patient was discharged. Close follow-up with a psychiatrist and/or therapist was recommended and community psychiatric resources were provided. Patient is to return to the ED if any urgent or potentially life-threatening concerns.     Ideally, I would have preferred for the patient to engage in a more meaningful meeting with me today.  I would have liked to explore the possibility of restarting lithium, attempt to gather past records highlighting her treatment with lithium to better understand the concerns that led to its discontinuation, and explore alternative medications to Zyprexa if her response was not satisfactory.  Unfortunately, our meeting today could not move in this direction and the patient was discharged per her request.    Discharge Diagnoses:   Final diagnoses:   Schizoaffective disorder, depressive type (H)       Treatment Plan:  -The patient did not appear interested in continuing olanzapine due to excessive sedation at a dose of 10 mg nightly.  A lower dose or alternative medication would have been considered however the patient did not care to engage in this discussion.  -Resources for outpatient mental health treatment including medication management and psychotherapy  -Urine drug screen and urinalysis are pending collection  -Noting that the patient does not meet criteria to be placed under an involuntary hold, we will proceed with discharge back to the community per her request.      At  the time of discharge, the patient's acute suicide risk was determined to be low due to the following factors: Reduction in the intensity of mood/anxiety symptoms that preceded the admission, denial of suicidal thoughts, denies feeling helpless or hopeless, not currently under the influence of alcohol or illicit substances, denies experiencing command hallucinations, no immediate access to firearms. The patient's acute risk could be higher if noncompliant with their treatment plan, medications, follow-up appointments or using illicit substances or alcohol. Protective factors include: social supports, stable housing    I spent more than 30 minutes on discharge day activities.    --  Deniz Myers MD  [facility] EMERGENCY DEPT  EmPATH Unit       Deniz Myers MD  04/11/25 0311

## 2025-04-02 NOTE — DISCHARGE INSTRUCTIONS
Upcoming Appointments    Type: Therapy - (In-Person)  Date: Tuesday, 4/8/2025    Time: 9:30 am - 10:20 am  Provider: Esperanza Veloz MA  [facility]  Location: [facility], [address] [place] [address], [facility] [address], [place] [address]  Phone: [phone]    Patient instructions  My office is located at: [address] [place] [address]. [address]. Please complete all intake paperwork before the appointment. Intake paperwork will be emailed from [facility]'s client portal. Please call to confirm appointment.     -------    Type: Medication Mgmt - (In-Person)  Date: Thursday, 4/10/2025    Time: 10:00 am - 11:00 am  Provider: Abby Kern  MSN  CNP,PMHNP  Location: [address] , [address], [place] [address]  Phone: [phone]    Patient instructions  Before your appointment, you must speak with our Intake Department. Our intake team will attempt to contact you. If you do not hear from them within 24 hours, please call them at [phone] and tell them you are a P referral. If you do not speak with our Intake Department and complete the necessary paperwork they send you, we cannot see you at your scheduled appointment time.        Mental health recommendations    Please follow-up with your outpatient team about your visit today.    2.  One of our care coordinators will reach out to you after your discharge.  If you have any questions about additional resources please call our coordinators at # [phone]. If you would like to schedule an appointment for therapy or psychiatry  please call our Behavioral Access Scheduling office at [phone].      3.  Please use aftercare plan and already established coping skills and safety planning as needed.     4.  Please call 911 and/or return to the emergency department if her symptoms worsen or your safety becomes compromised. [facility] Adult crisis line, [phone]         [address] [place] [address] Triage Open Monday  through Friday     Medical and Behavioral Health Triage is a new service at [address] [place] [address] that brings together community-based mental health agencies, [facility] , and [facility]) to address our clients' complex needs. The goal of this program is to reach people that are not already receiving services or that are not already connected to case management who have an urgent concern related to their mental health or substance use disorder.   Care coordinators, peer recovery specialists, and health professionals at [address] [place] [address] will address clients' physical health, mental health, addiction issues - and also the wrap-around services that they need to stay healthy, including housing, health insurance, and other resources.   Triage is in [address] at [address] [place] [address]. It is accessible by police from the parking lot. Everyone else can come through the front door of [address] [place] [address], and follow signs to [address].     Triage hours:9:000 am - 5:00 pm  Triage Phone Number: [phone]  Location: [address] [place] [address] psychiatry [facility]   [phone]  Same-Day Care Option  At our [facility], [facility], and [facility], we now provide same-day therapy/counseling support (for new and returning clients) and/or prescriber/medication services (for returning clients only) without an appointment. Same-day services vary per location. For clinic hours and services per location, visit each clinic's webpage.     To participate, simply walk in during open clinic hours and wait for an opening. These services are available based on client cancellations and no-shows. You can also call Central Access at [phone] to be placed on a waitlist. Based upon the number of cancellations or no-shows for a particular day and the number of clients on the waitlist, we cannot, unfortunately, guarantee that you will be seen through this  same-day care option.  We will, however, communicate any clinic scheduling changes as they occur and do our best to accommodate your needs.     [facility]   [phone]  Acute Psychiatry Services (APS)  Acute Psychiatric Services (APS) provides emergency services, 24 hours a day 7 days a week, to persons experiencing mental health crises including psychosis, depression, violence or suicide, and other crisis situations Clients may present on a walk-in basis or are referred from outside agencies or individuals.         Aftercare Plan    If I am feeling unsafe or I am in a crisis, I will:   Contact my established care providers   Call the National Suicide Prevention Lifeline: 720.650.1184   Go to the nearest emergency room   Call 911   Your [facility] has a mental health crisis team you can call 24/7: [facility] Adult, [phone]      Things I am able to do on my own to cope or help me feel better:   -Practice square breathing when I begin to feel anxious - in breath through the nose for the count   of 4 and the first line on the square. Out breath through the mouth for the count of 4 for the second line   of the square. Repeat to complete the square. Repeat the square as many times as needed.    Things that I am able to do with others to cope or help me feel better: -Use community resources, including hotline numbers, [facility] crisis and support meetings    Things I can use or do for distraction: -Distraction skills of: going for walks, watching TV, spending time outside, calling a friend or family member  -Download a meditation orestes and spend 15-20 minutes per day mediating/relaxing. Some apps to   download include: Calm, Headspace and Insight Timer. All 3 of these apps have free version    Changes I can make to support my mental health and wellness:   -Attend scheduled mental health therapy and psychiatric appointments and follow all recommendations  -Maintain a daily  "schedule/routine  -Practice deep breathing skills  -Abstain from all mood altering chemicals not currently prescribed to me     People in my life that I can ask for help: [employer] [place] [employer] Mental Illness (YNES)  203.773.2003 or 1.888.YNES.HELPS    Other things that are important when I m in crisis: -Commit to 30 minutes of self care daily - this can be as simple as taking a shower, going for a walk, cooking a meal, read, writing, etc        Crisis Lines  Crisis Text Line  Text 756322  You will be connected with a trained live crisis counselor to provide support.    Por espanol, texto  JEN a 718352 o texto a 442-AYUDAME en WhatsApp    National Hope Line  1.800.SUICIDE [9102423]      Community Resources  Fast Tracker  Linking people to mental health and substance use disorder resources  [employer]     [place] Mental Health Warm Line  Peer to peer support  Monday thru Saturday, 12 pm to 10 pm  344.067.0320 or 4.346.890.1781  Text \"Support\" to 19955    [employer] [place] on Mental Illness (YNES)  575.924.8490 or 1.888.YNES.HELPS        Mental Health Apps  My3  https://myDark Angel Productionspp.org/    VirtualHopeBox  https://Settle.org/apps/virtual-hope-box/      Additional Information  Today you were seen by a licensed mental health professional through Triage and Transition services, [facility] (P)  for a crisis assessment in the Emergency Department at [facility].  It is recommended that you follow up with your established providers (psychiatrist, mental health therapist, and/or primary care doctor - as relevant) as soon as possible. Coordinators from [facility] will be calling you in the next 24-48 hours to ensure that you have the resources you need.  You can also contact [facility] coordinators directly at [phone]. You may have been scheduled for or offered an appointment with a mental health provider. [facility] maintains an extensive network of licensed behavioral health providers to " connect patients with the services they need.  We do not charge providers a fee to participate in our referral network.  We match patients with providers based on a patient's specific needs, insurance coverage, and location.  Our first effort will be to refer you to a provider within your care system, and will utilize providers outside your care system as needed.

## 2025-04-02 NOTE — PROGRESS NOTES
"  Triage and Transition Services Extended Care Reassessment     Patient: Lisha goes by \"Endeavor,\" uses she/her pronouns  Date of Service: April 2, 2025  Site of Service: [facility] Emergency Dept                             [id number]  Patient was seen yes  Mode of Assessment: In person     Reason for Reassessment: Pts request, sx change.     History of Patient's Original Emergency Room Encounter: Per chart review, pt has a history of Shizoaffective Disorder Bipolar Type and PTSD. Chart indicates pt has a history of Alcohol Use Disorder. Pt denies that she's had any previous inpatient psychiatric hospitalizations and denies any previous suicide attempts. Pt's daughter, who accompanies pt during current ED visit, says that pt does not normally present as agitated and argumentative as she has today, and that it is indicative of her heightened anxiety.    Presentation Summary: Pt presented with an irritable and depressed affect. Pts mood was congruent with this presentation. Pt was oriented x4. Pt was engaged and cooperative with assessment. Pt at times was tearful and expressed anger, with her care for the past few years. Pt endorsed significant depressive sx, low motivation and anhedonia. Pt endorsed also having \"racing thoughts\" that she \"cannot stop.\" Pt endorsed years ago Lithium was helpful but this was discontinued \"because they (doctors) said it would kill me.\"  Pt endorsed that she only medication that has helped was lithium. Pt endorsed several stressors with the political state of the country \"we have orange wig over here fucking everything up.\" Pt also discussed her experiences of racism in the country and needing to have a provider \"that looks like me.\" Pt endorsed wanting to have help but has not been able to access it at her community clinic. Pt endorsed not feeling like herself and losing some friendships because of her depressive sx. Pt endorsed feeling supported by her family. Pt did not " "endorse any SI/SIB/HI. Pt endorsed that she has \"racing thoughts\" all the time that are \"about angels and devils.\" Pt denied any AH/VH. Pt endorsed that she is a spiritual person and God is a positive thing in her life. Pt endorsed willingness to stay at [facility] for another night of observation but also discussed safety planning and set up outpatient referrals id she would want to discharge after meeting with the provider today.      Therapeutic Interventions Provided: Engaged in guided discovery, explored patient's perspectives and helped expand them through socratic dialogue., Reviewed healthy living that supports positive mental health, including looking at sleep hygiene, regular movement, nutrition, and regular socialization., Identified and practiced coping skills.    Current Symptoms: anxious crying or feels like crying, irritable, negativistic, withdrawl/isolation, sadness, helplessness, hoplessness anxious   loss of appetite    Mental Status Exam   Affect: Appropriate  Appearance: Appropriate  Attention Span/Concentration: Attentive  Eye Contact: Variable    Fund of Knowledge: Appropriate   Language /Speech Content: Fluent  Language /Speech Volume: Normal  Language /Speech Rate/Productions: Articulate  Recent Memory: Intact  Remote Memory: Intact  Mood: Depressed, Anxious, Irritable  Orientation to Person: Yes   Orientation to Place: Yes  Orientation to Time of Day: Yes  Orientation to Date: Yes     Situation (Do they understand why they are here?): Yes  Psychomotor Behavior: Normal  Thought Content: Clear  Thought Form: Intact    Treatment Objective(s) Addressed: rapport building, processing feelings, assessing safety, identifying treatment goals, identifying additional supports, identifying an appropriate aftercare plan, identifying and practicing coping strategies    Patient Response to Interventions: acceptance expressed, verbalizes understanding    Progress Towards Goals:  Patient Reports Symptoms Are: " ongoing  Patient Progress Toward Goals: is making progress  Comment: Pt has been receptive to ED interventions.  Next Step to Work Toward Discharge: symptom stabilization  Symptom Stabilization Comment: Pt did not endorse any SI/SIB/HI or AH/VH.    Case Management: Summary of Interaction: None at time of assessment    C-SSRS Since Last Contact:   1. Wish to be Dead (Since Last Contact): No  2. Non-Specific Active Suicidal Thoughts (Since Last Contact): No     Actual Attempt (Since Last Contact): No  Has subject engaged in non-suicidal self-injurious behavior? (Since Last Contact): No  Interrupted Attempts (Since Last Contact): No  Aborted or Self-Interrupted Attempt (Since Last Contact): No  Preparatory Acts or Behavior (Since Last Contact): No  Suicide (Since Last Contact): No     Calculated C-SSRS Risk Score (Since Last Contact): No Risk Indicated    Plan: Final Disposition / Recommended Care Path: discharge  Plan for Care reviewed with assigned Medical Provider: yes  Plan for Care Team Review: provider, RN  Patient and/or validated legal guardian concurs: yes  Clinical Substantiation: At this time it does appear that Pt would benefit from further observation and psychiatric stabilization via medication management and ED level therapeutic interventions, due to increased depressive sx that are impacting her functioning but Pt declined this level of care and Pt does not meet criteria for a 72 hour hold. Pt does appear to be at higher risk of death by suicide accidental or intentional due to mental health hx. Pt was scheduled for therapy and psychiatry follow up.    Legal Status: Legal Status: Voluntary/Patient has signed consent for treatment    Session Status: Time session started: 0830  Time session ended: 0850  Session Duration (minutes): 20 minutes  Session Number: 1  Anticipated number of sessions or this episode of care: 2    Session Start Time: 0830  Session Stop Time: 0850  CPT codes: 00783 - Psychotherapy  (with patient) - 30 (16-37*) min  Time Spent: 20 minutes      CPT code(s) utilized: 30380 - Psychotherapy (with patient) - 30 (16-37*) min    Diagnosis:   Patient Active Problem List   Diagnosis Code    Schizoaffective disorder, bipolar type (H) F25.0    Arthritis M19.90    Essential hypertension I10    Hepatic steatosis K76.0    Chronic hepatitis C without hepatic coma (H) B18.2    Morbid obesity due to excess calories (H) E66.01    Depression, unspecified F32.A    Anxiety disorder, unspecified F41.9    Schizoaffective disorder, depressive type (H) F25.1       Primary Problem This Admission: Active Hospital Problems    *Anxiety disorder, unspecified      Schizoaffective disorder, depressive type (H)        Therese Aguilar, [facility]   Licensed Mental Health Professional (LMHP), [facility]  [phone]

## 2025-04-02 NOTE — ED PROVIDER NOTES
"[facility] Unit - Initial Psychiatric Observation Note  [facility] Emergency Department  Observation Initiation Date: Apr 1, 2025    Lisha Marie MRN: [id number]   Age: 59 year old YOB: 1965     History     Chief Complaint   Patient presents with    Psychiatric Evaluation     HPI  Lisha Marie is a 59 year old female with a past history notable for schizoaffective disorder, depressive type, PTSD, and anxiety.  Patient presented to the emergency department for evaluation of heightened anxiety and depressive symptoms.  Patient was medically evaluated and determined to be medically stable for transfer to [facility] for further psychiatric assessment.  Patient is nearing 8 hours in emergency care.  Per chart review, patient had a telephone visit with her outpatient psychiatric provider at [facility] today.  Patient reported to her outpatient psychiatric provider that Wellbutrin is not working and that she needs a medication change.  Patient's outpatient provider documents that she encouraged patient to present to the hospital for evaluation.  On the unit, patient has been intermittently agitated and only agreeable to taking diphenhydramine.  On interview, patient reports that she has been struggling with ongoing symptoms of depression, anxiety, as well as insomnia.  When attempting to restate or reflect on her statements, patient often rebut's providers statements.  Patient makes mention of feeling as though \"white people\" are going to kill her and burn her own mistake.  She talks about an upcoming  [employer] meeting which has led to heightened anxiety.  Patient indicates difficulty slowing her mind down, stating that her thoughts \"do not stop.\"  She reports that the only thing that has been helpful in the past has been lithium, but she was told that she would die if she continued to take lithium.  Attempted to gain a better understanding of this, and patient was not able to provide additional details and is " "not sure why she was told this.  She denies any suicidal or homicidal thinking.  Denies any auditory or visual hallucinations.  Thoughts appear a bit disorganized.  Discussed trial of olanzapine to help reduce her racing thoughts and anxiety, and patient was agreeable to this.  Agrees to remain on observation overnight.    Past Medical History  No past medical history on file.  No past surgical history on file.  acetaminophen (TYLENOL) 500 MG tablet  AMLODIPINE BESYLATE PO  BANOPHEN 50 MG capsule  DIPHENHYDRAMINE HCL PO  gabapentin (NEURONTIN) 300 MG capsule  naproxen (NAPROSYN) 375 MG tablet      No Known Allergies  Family History  No family history on file.  Social History   Social History     Tobacco Use    Smoking status: Never    Smokeless tobacco: Never   Substance Use Topics    Alcohol use: Yes     Alcohol/week: 0.0 standard drinks of alcohol     Comment: occasional    Drug use: No          Review of Systems  A medically appropriate review of systems was performed with pertinent positives and negatives noted in the HPI, and all other systems negative.    Physical Examination   BP: (!) 141/103  Pulse: 80  Temp: 98.5  F (36.9  C)  Resp: 18  Height: 170.2 cm (5' 7\")  Weight: 108.9 kg (240 lb)  SpO2: 100 %    Physical Exam  General: Appears stated age.   Neuro: Alert and fully oriented. Extremities appear to demonstrate normal strength on visual inspection.   Integumentary/Skin: no rash visualized, normal color    Psychiatric Examination   Appearance: awake, alert, adequately groomed, dressed in hospital scrubs, and appeared as age stated  Attitude:  guarded and slightly uncooperative  Eye Contact:  fair  Mood:  anxious and depressed  Affect:  mood congruent, intensity is heightened, and labile  Speech:  clear, coherent  Psychomotor Behavior:  no evidence of tardive dyskinesia, dystonia, or tics  Thought Process:  disorganized and illogical, tangential  Associations:  no loose associations  Thought Content:   " Denies suicidal or homicidal thinking.  Denies auditory or visual hallucinations.  Insight:  partial  Judgement:  fair  Oriented to:  time, person, and place  Attention Span and Concentration:  fair  Recent and Remote Memory:  fair  Language: able to name/identify objects without impairment  Fund of Knowledge: intact with awareness of current and past events    ED Course     ED Course as of 04/01/25 2133   Tue Apr 01, 2025   1421 I obtained history and examined the patient as noted above     9090 I spoke with PRERNA Antonio, after her evaluation of the patient.        Labs Ordered and Resulted from Time of ED Arrival to Time of ED Departure - No data to display    Assessments & Plan (with Medical Decision Making)   Patient presenting with symptoms of heightened anxiety as well as a depressed mood.  History of schizoaffective disorder, and has only been prescribed Wellbutrin recently.  Patient not finding this medication to be helpful, and is a bit hesitant to consider other medications.  Presenting with paranoia and disorganized thinking. Nursing notes reviewed noting no acute issues.     I have reviewed the assessment completed by the [facility].     During the observation period, the patient did not require medications for agitation, and did not require restraints/seclusion for patient and/or provider safety.     The patient was found to have a psychiatric condition that would benefit from an observation stay in the emergency department for further psychiatric stabilization and/or coordination of a safe disposition. The observation plan includes serial assessments of psychiatric condition, potential administration of medications if indicated, further disposition pending the patient's psychiatric course during the monitoring period.     Preliminary diagnosis:    ICD-10-CM    1. Schizoaffective disorder, depressive type (H)  F25.1            Treatment Plan:  - Begin olanzapine 10 mg at bedtime for treatment of psychosis  "symptoms  - Will stop Wellbutrin. Patient indicates this has not been helpful and may be leading to more activation.   -Patient indicates that lithium has been the most helpful medication, but reports she was told she would \"die\" if she continued to take this.  It is unclear why she received this information as I do not see this documented in her chart..  She was last prescribed lithium in 2024.  Consider further discussion regarding restarting lithium  -Urinalysis and urine drug screen have been ordered  -Joseph to observation status, reassess tomorrow    --  Carlos Scott CNP   [facility] EMERGENCY DEPT  [facility] Unit      Carlos Scott CNP  04/01/25 [id number]    "

## 2025-04-02 NOTE — PROGRESS NOTES
Patient woke up this morning and used the bathroom. She appears tensed and withdrawn. Patient would not respond to questions verbally or engage in any conversation. After eating breakfast, vitals taken and medications given, patient expressed appreciation. Patient met with LMHP. Recommendation is to stay on OBS one more night and hopefully Dr. Myers to see into patient restarting Lithium as she says that worked better in the past. She denies SI or hallucination. Patient continues to sit in her recliner watching TV.

## 2025-04-02 NOTE — ED NOTES
Pt has been sleeping in chair 2 with no distress noted; respiration even and unlabored. Got up once this shift to use the BR. Plan of care ongoing.

## 2025-04-02 NOTE — ED NOTES
"Patient approached nurse and stated she wished to leave. Denies SI. MD discharged patient. AVS reviewed. Patient visibly angry, \"If I wanted to sleep, I could do that at a hotel, all they did for me here was give me meds that made me sleep\". Patient encouraged to call patient representative. Patient refused to fill out survey form. Patient states daughter will be picking her up.   "

## 2025-04-02 NOTE — ED NOTES
Accepted Zyprexa at HS after being encouraged to do so by  provider. Currently resting in recliner-less agitated.

## 2025-04-02 NOTE — ED NOTES
Patient requested items from her bag and purse and towels to shower. Patient pleasant and cooperative.

## 2025-04-03 ENCOUNTER — TELEPHONE (OUTPATIENT)
Dept: BEHAVIORAL HEALTH | Facility: CLINIC | Age: 60
End: 2025-04-03
Payer: COMMERCIAL

## 2025-04-03 NOTE — TELEPHONE ENCOUNTER
Called Patient to speak with them about the possible need for further appointments and resources.     Pt not happy and hung up

## 2025-04-16 ENCOUNTER — LAB REQUISITION (OUTPATIENT)
Dept: LAB | Facility: CLINIC | Age: 60
End: 2025-04-16
Payer: COMMERCIAL

## 2025-04-16 DIAGNOSIS — M54.42 LUMBAGO WITH SCIATICA, LEFT SIDE: ICD-10-CM

## 2025-04-16 DIAGNOSIS — M54.41 LUMBAGO WITH SCIATICA, RIGHT SIDE: ICD-10-CM

## 2025-04-16 DIAGNOSIS — G89.29 OTHER CHRONIC PAIN: ICD-10-CM

## 2025-04-16 DIAGNOSIS — R53.83 OTHER FATIGUE: ICD-10-CM

## 2025-04-16 LAB
ALBUMIN SERPL BCG-MCNC: 4.5 G/DL (ref 3.5–5.2)
ALP SERPL-CCNC: 69 U/L (ref 40–150)
ALT SERPL W P-5'-P-CCNC: 14 U/L (ref 0–50)
ANION GAP SERPL CALCULATED.3IONS-SCNC: 13 MMOL/L (ref 7–15)
AST SERPL W P-5'-P-CCNC: 20 U/L (ref 0–45)
BILIRUB SERPL-MCNC: 0.3 MG/DL
BUN SERPL-MCNC: 18.9 MG/DL (ref 6–20)
CALCIUM SERPL-MCNC: 9.7 MG/DL (ref 8.8–10.4)
CHLORIDE SERPL-SCNC: 102 MMOL/L (ref 98–107)
CHOLEST SERPL-MCNC: 332 MG/DL
CREAT SERPL-MCNC: 0.9 MG/DL (ref 0.51–0.95)
EGFRCR SERPLBLD CKD-EPI 2021: 74 ML/MIN/1.73M2
FASTING STATUS PATIENT QL REPORTED: NO
FASTING STATUS PATIENT QL REPORTED: NO
GLUCOSE SERPL-MCNC: 97 MG/DL (ref 70–99)
HCO3 SERPL-SCNC: 24 MMOL/L (ref 22–29)
HDLC SERPL-MCNC: 58 MG/DL
LDLC SERPL CALC-MCNC: 227 MG/DL
NONHDLC SERPL-MCNC: 274 MG/DL
POTASSIUM SERPL-SCNC: 4 MMOL/L (ref 3.4–5.3)
PROT SERPL-MCNC: 8.3 G/DL (ref 6.4–8.3)
SODIUM SERPL-SCNC: 139 MMOL/L (ref 135–145)
TRIGL SERPL-MCNC: 236 MG/DL
TSH SERPL DL<=0.005 MIU/L-ACNC: 0.51 UIU/ML (ref 0.3–4.2)

## 2025-04-16 PROCEDURE — 86038 ANTINUCLEAR ANTIBODIES: CPT | Mod: ORL | Performed by: NURSE PRACTITIONER

## 2025-04-16 PROCEDURE — 86431 RHEUMATOID FACTOR QUANT: CPT | Mod: ORL | Performed by: NURSE PRACTITIONER

## 2025-04-16 PROCEDURE — 80061 LIPID PANEL: CPT | Mod: ORL | Performed by: NURSE PRACTITIONER

## 2025-04-16 PROCEDURE — 86140 C-REACTIVE PROTEIN: CPT | Mod: ORL | Performed by: NURSE PRACTITIONER

## 2025-04-16 PROCEDURE — 82306 VITAMIN D 25 HYDROXY: CPT | Mod: ORL | Performed by: NURSE PRACTITIONER

## 2025-04-16 PROCEDURE — 82607 VITAMIN B-12: CPT | Mod: ORL | Performed by: NURSE PRACTITIONER

## 2025-04-16 PROCEDURE — 80053 COMPREHEN METABOLIC PANEL: CPT | Mod: ORL | Performed by: NURSE PRACTITIONER

## 2025-04-16 PROCEDURE — 84443 ASSAY THYROID STIM HORMONE: CPT | Mod: ORL | Performed by: NURSE PRACTITIONER

## 2025-04-17 LAB
ANA SER QL IF: NEGATIVE
CRP SERPL-MCNC: <3 MG/L
RHEUMATOID FACT SERPL-ACNC: <10 IU/ML
VIT B12 SERPL-MCNC: 319 PG/ML (ref 232–1245)
VIT D+METAB SERPL-MCNC: 19 NG/ML (ref 20–50)